# Patient Record
Sex: FEMALE | Race: WHITE | NOT HISPANIC OR LATINO | Employment: FULL TIME | URBAN - METROPOLITAN AREA
[De-identification: names, ages, dates, MRNs, and addresses within clinical notes are randomized per-mention and may not be internally consistent; named-entity substitution may affect disease eponyms.]

---

## 2021-09-02 ENCOUNTER — PREP FOR SURGERY (OUTPATIENT)
Dept: OTHER | Facility: HOSPITAL | Age: 31
End: 2021-09-02

## 2021-09-02 ENCOUNTER — OFFICE VISIT (OUTPATIENT)
Dept: GASTROENTEROLOGY | Facility: CLINIC | Age: 31
End: 2021-09-02

## 2021-09-02 VITALS
DIASTOLIC BLOOD PRESSURE: 73 MMHG | OXYGEN SATURATION: 100 % | HEART RATE: 89 BPM | WEIGHT: 113.9 LBS | BODY MASS INDEX: 21.5 KG/M2 | HEIGHT: 61 IN | SYSTOLIC BLOOD PRESSURE: 133 MMHG

## 2021-09-02 DIAGNOSIS — R10.13 EPIGASTRIC PAIN: ICD-10-CM

## 2021-09-02 DIAGNOSIS — R11.0 NAUSEA: ICD-10-CM

## 2021-09-02 DIAGNOSIS — K21.9 GASTROESOPHAGEAL REFLUX DISEASE, UNSPECIFIED WHETHER ESOPHAGITIS PRESENT: Primary | ICD-10-CM

## 2021-09-02 PROCEDURE — 99204 OFFICE O/P NEW MOD 45 MIN: CPT | Performed by: NURSE PRACTITIONER

## 2021-09-02 RX ORDER — MULTIPLE VITAMINS W/ MINERALS TAB 9MG-400MCG
1 TAB ORAL DAILY
COMMUNITY

## 2021-09-02 RX ORDER — OMEPRAZOLE 20 MG/1
20 CAPSULE, DELAYED RELEASE ORAL DAILY
COMMUNITY
End: 2021-09-02

## 2021-09-02 RX ORDER — OMEPRAZOLE 40 MG/1
40 CAPSULE, DELAYED RELEASE ORAL DAILY
Qty: 30 CAPSULE | Refills: 3 | Status: SHIPPED | OUTPATIENT
Start: 2021-09-02

## 2021-09-02 NOTE — PROGRESS NOTES
"Chief Complaint  Abdominal Pain (Burning sensation, began in April ) and Heartburn    Pooja Vee is a 31 y.o. female who presents to [facility] as a new patient.    31-year-old female presenting the office today as a new patient with a history of abdominal pain and heartburn.  Patient is currently on omeprazole 20 mg daily. Patient reports since April 30th she has had epigastric pain, reflux, nausea, heartburn. She reports belching often.  Aggravated by even water. 20 lbs weight loss over the past 4 month. No family history of colon cancer or gastric cancer.  She reports trying to adhere to a bland diet.  Denies NSAID use.  Patient denies fever, nausea, vomiting, weight loss, night sweats, melena, hematochezia, hematemesis.    Never had EGD or colonoscopy.    Result Review :   The following data was reviewed by: Rowena Vyas NP on 09/02/2021 for             Past Medical History:   Diagnosis Date   • Kidney problem    • Raynaud's disease        Past Surgical History:   Procedure Laterality Date   • EAR TUBES     • FINGER SURGERY Right    • KIDNEY SURGERY     • TYMPANOPLASTY     • WISDOM TOOTH EXTRACTION           Current Outpatient Medications:   •  multivitamin with minerals tablet tablet, Take 1 tablet by mouth Daily., Disp: , Rfl:   •  omeprazole (priLOSEC) 40 MG capsule, Take 1 capsule by mouth Daily., Disp: 30 capsule, Rfl: 3     No Known Allergies    Family History   Problem Relation Age of Onset   • Colon polyps Mother    • Irritable bowel syndrome Mother    • Colon polyps Father    • Irritable bowel syndrome Father    • Prostate cancer Father    • Irritable bowel syndrome Sister         Social History     Social History Narrative   • Not on file       Objective     Vital Signs:   /73 (BP Location: Left arm, Patient Position: Sitting, Cuff Size: Adult)   Pulse 89   Ht 154.9 cm (61\")   Wt 51.7 kg (113 lb 14.4 oz)   SpO2 100%   BMI 21.52 kg/m²     Body mass " index is 21.52 kg/m².    Physical Exam  Constitutional:       General: She is not in acute distress.     Appearance: Normal appearance. She is well-developed and normal weight.   Eyes:      Conjunctiva/sclera: Conjunctivae normal.      Pupils: Pupils are equal, round, and reactive to light.      Visual Fields: Right eye visual fields normal and left eye visual fields normal.   Cardiovascular:      Rate and Rhythm: Normal rate and regular rhythm.      Heart sounds: Normal heart sounds.   Pulmonary:      Effort: Pulmonary effort is normal. No retractions.      Breath sounds: Normal breath sounds and air entry.      Comments: Inspection of chest: normal appearance  Abdominal:      General: Bowel sounds are normal.      Palpations: Abdomen is soft.      Tenderness: There is no abdominal tenderness.      Comments: No appreciable hepatosplenomegaly   Musculoskeletal:      Cervical back: Neck supple.      Right lower leg: No edema.      Left lower leg: No edema.   Lymphadenopathy:      Cervical: No cervical adenopathy.   Skin:     Findings: No lesion.      Comments: Turgor normal   Neurological:      Mental Status: She is alert and oriented to person, place, and time.   Psychiatric:         Mood and Affect: Mood and affect normal.                 Assessment and Plan    Diagnoses and all orders for this visit:    1. Gastroesophageal reflux disease, unspecified whether esophagitis present (Primary)    2. Nausea    3. Epigastric pain    Other orders  -     omeprazole (priLOSEC) 40 MG capsule; Take 1 capsule by mouth Daily.  Dispense: 30 capsule; Refill: 3      31-year-old female presenting the office today as a new patient with a history of abdominal pain and heartburn.  Patient is currently on omeprazole 20 mg daily.  Plan:  I have recommended that the patient undergo further evaluation with an EGD.  I have discussed this procedure in detail with the patient.  I have discussed the risks, benefits and alternatives.  I have  discussed the risk of anesthesia, bleeding and perforation.  Patient understands these risks, benefits and alternatives and wishes to proceed.  I will schedule her at her earliest convenience.  I will increase her omeprazole from 20 mg to 40 mg I have sent this to the pharmacy.  I have educated patient on foods to avoid with reflux.  I have educated the patient to avoid NSAIDs.  We will follow-up in the office after endoscopy.  Patient to call with any questions or concerns.  Patient is agreeable to the plan.            Follow Up   Return for Follow up after endoscopy in office.  Patient was given instructions and counseling regarding her condition or for health maintenance advice. Please see specific information pulled into the AVS if appropriate.

## 2021-09-02 NOTE — PATIENT INSTRUCTIONS
Food Choices for Gastroesophageal Reflux Disease, Adult  When you have gastroesophageal reflux disease (GERD), the foods you eat and your eating habits are very important. Choosing the right foods can help ease your discomfort. Think about working with a food expert (dietitian) to help you make good choices.  What are tips for following this plan?  Reading food labels  · Read the label for foods that are low in saturated fat. Foods that may help with your symptoms include:  ? Foods that have less than 5% of daily value (DV) of fat.  ? Foods that have 0 grams of trans fat.  Cooking  · Do not root your food. Cook your food by baking, steaming, grilling, or broiling. These are all methods that do not need a lot of fat for cooking.  · To add flavor, try to use herbs that are low in spice and acidity.  Meal planning    · Choose healthy foods that are low in fat, such as:  ? Fruits and vegetables.  ? Whole grains.  ? Low-fat dairy products.  ? Lean meats, fish, and poultry.  · Eat small meals often instead of eating 3 large meals each day. Eat your meals slowly in a place where you are relaxed. Avoid bending over or lying down until 2-3 hours after eating.  · Limit high-fat foods such as fatty meats or fried foods.  · Limit your intake of oils, butter, and shortening to less than 8 teaspoons each day.  · Avoid the following:  ? Foods that cause symptoms. These may be different for different people. Keep a food diary to keep track of foods that cause symptoms.  ? Alcohol.  ? Drinking a lot of liquid with meals.  ? Eating meals during the 2-3 hours before bed.  Lifestyle  · Stay at a healthy weight. Ask your doctor what weight is healthy for you. If you need to lose weight, work with your doctor to do so safely.  · Exercise for at least 30 minutes on 5 or more days each week, or as told by your doctor.  · Wear loose-fitting clothes.  · Do not use any products that contain nicotine or tobacco, such as cigarettes,  e-cigarettes, and chewing tobacco. If you need help quitting, ask your doctor.  · Sleep with the head of your bed higher than your feet. Use a wedge under the mattress or blocks under the bed frame to raise the head of the bed.  What foods should eat?    Eat a healthy, well-balanced diet of fruits, vegetables, whole grains, low-fat dairy products, lean meats, fish, and poultry. Each person is different. Foods that may cause symptoms in one person may not cause any symptoms in another person. Work with your doctor to find foods that are safe for you.  The items listed above may not be a complete list of foods and beverages you can eat. Contact a dietitian for more information.  What foods should I avoid?  Limiting some of these foods may help in managing the symptoms of GERD. Everyone is different. Talk with a food expert or your doctor to help you find the exact foods to avoid, if any.  Fruits  Any fruits prepared with added fat. Any fruits that cause symptoms. For some people, this may include citrus fruits, such as oranges, grapefruit, pineapple, and celia.  Vegetables  Deep-fried vegetables. French fries. Any vegetables prepared with added fat. Any vegetables that cause symptoms. For some people, this may include tomatoes and tomato products, chili peppers, onions and garlic, and horseradish.  Grains  Pastries or quick breads with added fat.  Meats and other proteins  High-fat meats, such as fatty beef or pork, hot dogs, ribs, ham, sausage, salami, and hill. Fried meat or protein, including fried fish and fried chicken. Nuts and nut butters.  Dairy  Whole milk and chocolate milk. Sour cream. Cream. Ice cream. Cream cheese. Milkshakes.  Fats and oils  Butter. Margarine. Shortening. Ghee.  Beverages  Coffee and tea, with or without caffeine. Carbonated beverages. Sodas. Energy drinks. Fruit juice made with acidic fruits (such as orange or grapefruit). Tomato juice. Alcoholic drinks.  Sweets and  desserts  Chocolate and cocoa. Donuts.  Seasonings and condiments  Pepper. Peppermint and spearmint. Added salt. Any condiments, herbs, or seasonings that cause symptoms. For some people, this may include owusu, hot sauce, or vinegar-based salad dressings.  The items listed above may not be a complete list of foods and beverages you should avoid. Contact a dietitian for more information.  Questions to ask your doctor  Diet and lifestyle changes are often the first steps that are taken to manage symptoms of GERD. If diet and lifestyle changes do not help, talk with your doctor about taking medicines.  Where to find more information  · International [employer] Gastrointestinal [employer]: aboutgerd.org  Summary  · When you have GERD, food and lifestyle choices are very important in easing your symptoms.  · Eat small meals often instead of 3 large meals a day. Eat your meals slowly and in a place where you are relaxed.  · Avoid bending over or lying down until 2-3 hours after eating.  · Limit high-fat foods such as fatty meat or fried foods.  This information is not intended to replace advice given to you by your health care provider. Make sure you discuss any questions you have with your health care provider.  Document Revised: 10/12/2020 Document Reviewed: 10/12/2020  Elsevier Patient Education © 2021 [employer]

## 2021-09-03 ENCOUNTER — PATIENT ROUNDING (BHMG ONLY) (OUTPATIENT)
Dept: GASTROENTEROLOGY | Facility: CLINIC | Age: 31
End: 2021-09-03

## 2021-09-03 NOTE — PROGRESS NOTES
September 3, 2021    Hello, may I speak with Pooja Vee?    My name is Izabela, Practice Manager for [facility]. I'm calling about your recent visit with BARRY Tracy.      I am  with [facility]  [address] [place] [address]  [phone].    Before we get started may I verify your date of birth? 1990    I am calling to officially welcome you to our practice and ask about your recent visit. Is this a good time to talk? Yes    Tell me about your visit with us. What things went well?  It went fine. Just waiting for the scope.       We're always looking for ways to make our patients' experiences even better. Do you have recommendations on ways we may improve? No    Overall were you satisfied with your first visit to our practice? Yes       I appreciate you taking the time to speak with me today. Is there anything else I can do for you? No.      Thank you, and have a great day.

## 2021-11-08 ENCOUNTER — LAB (OUTPATIENT)
Dept: LAB | Facility: HOSPITAL | Age: 31
End: 2021-11-08

## 2021-11-08 DIAGNOSIS — R10.13 EPIGASTRIC PAIN: ICD-10-CM

## 2021-11-08 DIAGNOSIS — R11.0 NAUSEA: ICD-10-CM

## 2021-11-08 DIAGNOSIS — K21.9 GASTROESOPHAGEAL REFLUX DISEASE, UNSPECIFIED WHETHER ESOPHAGITIS PRESENT: ICD-10-CM

## 2021-11-08 PROCEDURE — C9803 HOPD COVID-19 SPEC COLLECT: HCPCS

## 2021-11-08 PROCEDURE — 87635 SARS-COV-2 COVID-19 AMP PRB: CPT

## 2021-11-09 LAB — SARS-COV-2 N GENE RESP QL NAA+PROBE: NOT DETECTED

## 2021-11-11 ENCOUNTER — TELEPHONE (OUTPATIENT)
Dept: GASTROENTEROLOGY | Facility: CLINIC | Age: 31
End: 2021-11-11

## 2021-11-11 NOTE — TELEPHONE ENCOUNTER
Melinda from auth dept called to inform the office that Ms. Vee's insurance has not yet approved procedure.  I have called the patient and left her a voice mail to contact the office concerning her scope appt.

## 2021-11-11 NOTE — TELEPHONE ENCOUNTER
Financial auth dept called at 11am to inform the office that the patient's insurance has not approved the procedure. After speaking with the patient and the financial department, the patient has agreed to sign ABN to pay out of pocket if the insurance does not approve the procedure.

## 2021-11-12 ENCOUNTER — HOSPITAL ENCOUNTER (OUTPATIENT)
Facility: HOSPITAL | Age: 31
Setting detail: HOSPITAL OUTPATIENT SURGERY
Discharge: HOME | End: 2021-11-12
Attending: INTERNAL MEDICINE | Admitting: INTERNAL MEDICINE

## 2021-11-12 ENCOUNTER — ANESTHESIA (OUTPATIENT)
Dept: GASTROENTEROLOGY | Facility: HOSPITAL | Age: 31
End: 2021-11-12

## 2021-11-12 ENCOUNTER — ANESTHESIA EVENT (OUTPATIENT)
Dept: GASTROENTEROLOGY | Facility: HOSPITAL | Age: 31
End: 2021-11-12

## 2021-11-12 VITALS
DIASTOLIC BLOOD PRESSURE: 62 MMHG | SYSTOLIC BLOOD PRESSURE: 127 MMHG | WEIGHT: 104.94 LBS | TEMPERATURE: 98.1 F | OXYGEN SATURATION: 99 % | HEART RATE: 78 BPM | BODY MASS INDEX: 19.83 KG/M2 | RESPIRATION RATE: 16 BRPM

## 2021-11-12 DIAGNOSIS — R10.13 EPIGASTRIC PAIN: ICD-10-CM

## 2021-11-12 DIAGNOSIS — K21.9 GASTROESOPHAGEAL REFLUX DISEASE WITHOUT ESOPHAGITIS: Primary | ICD-10-CM

## 2021-11-12 DIAGNOSIS — R11.0 NAUSEA: ICD-10-CM

## 2021-11-12 DIAGNOSIS — K21.9 GASTROESOPHAGEAL REFLUX DISEASE, UNSPECIFIED WHETHER ESOPHAGITIS PRESENT: ICD-10-CM

## 2021-11-12 LAB — B-HCG UR QL: NEGATIVE

## 2021-11-12 PROCEDURE — 88305 TISSUE EXAM BY PATHOLOGIST: CPT | Performed by: INTERNAL MEDICINE

## 2021-11-12 PROCEDURE — 81025 URINE PREGNANCY TEST: CPT | Performed by: ANESTHESIOLOGY

## 2021-11-12 PROCEDURE — 43239 EGD BIOPSY SINGLE/MULTIPLE: CPT | Performed by: INTERNAL MEDICINE

## 2021-11-12 PROCEDURE — 25010000002 PROPOFOL 10 MG/ML EMULSION: Performed by: NURSE ANESTHETIST, CERTIFIED REGISTERED

## 2021-11-12 RX ORDER — SODIUM CHLORIDE, SODIUM LACTATE, POTASSIUM CHLORIDE, CALCIUM CHLORIDE 600; 310; 30; 20 MG/100ML; MG/100ML; MG/100ML; MG/100ML
30 INJECTION, SOLUTION INTRAVENOUS CONTINUOUS
Status: DISCONTINUED | OUTPATIENT
Start: 2021-11-12 | End: 2021-11-12 | Stop reason: HOSPADM

## 2021-11-12 RX ORDER — DICYCLOMINE HCL 20 MG
20 TABLET ORAL EVERY 6 HOURS
Qty: 120 TABLET | Refills: 5 | Status: SHIPPED | OUTPATIENT
Start: 2021-11-12 | End: 2021-12-12

## 2021-11-12 RX ORDER — CHLORAL HYDRATE 500 MG
CAPSULE ORAL
COMMUNITY

## 2021-11-12 RX ORDER — LIDOCAINE HYDROCHLORIDE 20 MG/ML
INJECTION, SOLUTION INFILTRATION; PERINEURAL AS NEEDED
Status: DISCONTINUED | OUTPATIENT
Start: 2021-11-12 | End: 2021-11-12 | Stop reason: SURG

## 2021-11-12 RX ORDER — PROPOFOL 10 MG/ML
VIAL (ML) INTRAVENOUS AS NEEDED
Status: DISCONTINUED | OUTPATIENT
Start: 2021-11-12 | End: 2021-11-12 | Stop reason: SURG

## 2021-11-12 RX ADMIN — PROPOFOL 200 MCG/KG/MIN: 10 INJECTION, EMULSION INTRAVENOUS at 11:31

## 2021-11-12 RX ADMIN — LIDOCAINE HYDROCHLORIDE 100 MG: 20 INJECTION, SOLUTION INFILTRATION; PERINEURAL at 11:31

## 2021-11-12 RX ADMIN — SODIUM CHLORIDE, POTASSIUM CHLORIDE, SODIUM LACTATE AND CALCIUM CHLORIDE 30 ML/HR: 600; 310; 30; 20 INJECTION, SOLUTION INTRAVENOUS at 10:05

## 2021-11-12 RX ADMIN — PROPOFOL 50 MG: 10 INJECTION, EMULSION INTRAVENOUS at 11:31

## 2021-11-12 NOTE — H&P
Pre Procedure History & Physical    Chief Complaint:   gerd  epig pain    Subjective     HPI:   As above    Past Medical History:   Past Medical History:   Diagnosis Date   • Kidney problem    • Raynaud's disease        Past Surgical History:  Past Surgical History:   Procedure Laterality Date   • EAR TUBES     • FINGER SURGERY Right    • KIDNEY SURGERY     • TYMPANOPLASTY     • WISDOM TOOTH EXTRACTION         Family History:  Family History   Problem Relation Age of Onset   • Colon polyps Mother    • Irritable bowel syndrome Mother    • Colon polyps Father    • Irritable bowel syndrome Father    • Prostate cancer Father    • Irritable bowel syndrome Sister        Social History:   reports that she has never smoked. She has never used smokeless tobacco. She reports that she does not drink alcohol and does not use drugs.    Medications:   Medications Prior to Admission   Medication Sig Dispense Refill Last Dose   • multivitamin with minerals tablet tablet Take 1 tablet by mouth Daily.   Past Week at Unknown time   • Omega-3 1000 MG capsule Take  by mouth.   Past Week at Unknown time   • omeprazole (priLOSEC) 40 MG capsule Take 1 capsule by mouth Daily. 30 capsule 3 11/11/2021 at Unknown time       Allergies:  Patient has no known allergies.        Objective     Blood pressure 128/95, pulse 105, temperature 98.8 °F (37.1 °C), temperature source Temporal, resp. rate 16, weight 47.6 kg (104 lb 15 oz), last menstrual period 10/25/2021, SpO2 99 %, not currently breastfeeding.    Physical Exam   Constitutional: Pt is oriented to person, place, and time and well-developed, well-nourished, and in no distress.   Mouth/Throat: Oropharynx is clear and moist.   Neck: Normal range of motion.   Cardiovascular: Normal rate, regular rhythm and normal heart sounds.    Pulmonary/Chest: Effort normal and breath sounds normal.   Abdominal: Soft. Nontender  Skin: Skin is warm and dry.   Psychiatric: Mood, memory, affect and judgment  normal.     Assessment/Plan     Diagnosis:  gerd  epig pain    Anticipated Surgical Procedure:  egd    The risks, benefits, and alternatives of this procedure have been discussed with the patient or the responsible party- the patient understands and agrees to proceed.

## 2021-11-12 NOTE — ANESTHESIA POSTPROCEDURE EVALUATION
Patient: Pooja Vee    Procedure Summary     Date: 11/12/21 Room / Location: [facility] ENDOSCOPY 2 / [facility] ENDOSCOPY    Anesthesia Start: 1129 Anesthesia Stop: 1157    Procedure: ESOPHAGOGASTRODUODENOSCOPY WITH BIOPSIES (N/A ) Diagnosis:       Gastroesophageal reflux disease, unspecified whether esophagitis present      Nausea      Epigastric pain      (Gastroesophageal reflux disease, unspecified whether esophagitis present [K21.9])      (Nausea [R11.0])      (Epigastric pain [R10.13])    Surgeons: Ronald Cross MD Provider: Rolando Pham MD    Anesthesia Type: general ASA Status: 2          Anesthesia Type: general    Vitals  No vitals data found for the desired time range.          Post Anesthesia Care and Evaluation    Patient location during evaluation: bedside  Patient participation: complete - patient participated  Level of consciousness: awake  Pain management: adequate  Airway patency: patent  Anesthetic complications: No anesthetic complications  PONV Status: none  Cardiovascular status: acceptable and stable  Respiratory status: acceptable and room air  Hydration status: acceptable    Comments: An Anesthesiologist personally participated in the most demanding procedures (including induction and emergence if applicable) in the anesthesia plan, monitored the course of anesthesia administration at frequent intervals and remained physically present and available for immediate diagnosis and treatment of emergencies.

## 2021-11-12 NOTE — ANESTHESIA PREPROCEDURE EVALUATION
Anesthesia Evaluation                  Airway   Mallampati: II  TM distance: >3 FB  Neck ROM: full  No difficulty expected  Dental      Pulmonary - normal exam    breath sounds clear to auscultation  Cardiovascular - normal exam    Rhythm: regular        Neuro/Psych  GI/Hepatic/Renal/Endo    (+)  GERD,  renal disease,     Musculoskeletal     Abdominal    Substance History      OB/GYN          Other                        Anesthesia Plan    ASA 2     general   total IV anesthesia  intravenous induction     Anesthetic plan, all risks, benefits, and alternatives have been provided, discussed and informed consent has been obtained with: patient and father.

## 2021-11-15 LAB
CYTO UR: NORMAL
LAB AP CASE REPORT: NORMAL
LAB AP CLINICAL INFORMATION: NORMAL
PATH REPORT.FINAL DX SPEC: NORMAL
PATH REPORT.GROSS SPEC: NORMAL

## 2021-12-08 ENCOUNTER — TELEPHONE (OUTPATIENT)
Dept: GASTROENTEROLOGY | Facility: CLINIC | Age: 31
End: 2021-12-08

## 2021-12-08 NOTE — TELEPHONE ENCOUNTER
I called pt it see if she wishes to schedule her imaging that was cancelled. After her EGD on 11/12/2021,  Dr Cross had ordered a DISIDA Scan and RUQ U/S. It was sched, but pt had cancelled it. Pt declined to sched at this time. Stating she has to pay over $3000.00 for the EGD... If she changes her mind she will call back to schedule imaging. Patrick

## 2021-12-20 ENCOUNTER — APPOINTMENT (OUTPATIENT)
Dept: NUCLEAR MEDICINE | Facility: HOSPITAL | Age: 31
End: 2021-12-20

## 2021-12-20 ENCOUNTER — APPOINTMENT (OUTPATIENT)
Dept: ULTRASOUND IMAGING | Facility: HOSPITAL | Age: 31
End: 2021-12-20

## 2022-01-07 ENCOUNTER — APPOINTMENT (OUTPATIENT)
Dept: ULTRASOUND IMAGING | Facility: HOSPITAL | Age: 32
End: 2022-01-07

## (undated) DEVICE — SOL IRRG H2O PL/BG 1000ML STRL

## (undated) DEVICE — Device: Brand: DEFENDO AIR/WATER/SUCTION AND BIOPSY VALVE

## (undated) DEVICE — SINGLE-USE BIOPSY FORCEPS: Brand: RADIAL JAW 4

## (undated) DEVICE — EGD OR ERCP KIT: Brand: MEDLINE INDUSTRIES, INC.